# Patient Record
Sex: FEMALE | Race: WHITE | NOT HISPANIC OR LATINO | URBAN - METROPOLITAN AREA
[De-identification: names, ages, dates, MRNs, and addresses within clinical notes are randomized per-mention and may not be internally consistent; named-entity substitution may affect disease eponyms.]

---

## 2017-06-06 ENCOUNTER — APPOINTMENT (RX ONLY)
Dept: URBAN - METROPOLITAN AREA CLINIC 84 | Facility: CLINIC | Age: 67
Setting detail: DERMATOLOGY
End: 2017-06-06

## 2017-06-06 VITALS
WEIGHT: 130 LBS | HEIGHT: 66 IN | SYSTOLIC BLOOD PRESSURE: 177 MMHG | HEART RATE: 94 BPM | DIASTOLIC BLOOD PRESSURE: 107 MMHG

## 2017-06-06 DIAGNOSIS — L60.3 NAIL DYSTROPHY: ICD-10-CM

## 2017-06-06 PROCEDURE — ? BIOPSY BY PUNCH METHOD

## 2017-06-06 PROCEDURE — ? PRESCRIPTION

## 2017-06-06 RX ORDER — SULFAMETHOXAZOLE AND TRIMETHOPRIM 800; 160 MG/1; MG/1
TABLET ORAL
Qty: 14 | Refills: 0 | Status: ERX | COMMUNITY
Start: 2017-06-06

## 2017-06-06 RX ORDER — MUPIROCIN 20 MG/G
OINTMENT TOPICAL
Qty: 1 | Refills: 1 | Status: ERX | COMMUNITY
Start: 2017-06-06

## 2017-06-06 RX ADMIN — MUPIROCIN: 20 OINTMENT TOPICAL at 19:23

## 2017-06-06 RX ADMIN — SULFAMETHOXAZOLE AND TRIMETHOPRIM: 800; 160 TABLET ORAL at 19:23

## 2017-06-06 ASSESSMENT — LOCATION DETAILED DESCRIPTION DERM: LOCATION DETAILED: RIGHT 4TH TOENAIL

## 2017-06-06 ASSESSMENT — LOCATION ZONE DERM: LOCATION ZONE: TOENAIL

## 2017-06-06 ASSESSMENT — LOCATION SIMPLE DESCRIPTION DERM: LOCATION SIMPLE: RIGHT 4TH TOE

## 2017-06-06 NOTE — PROCEDURE: BIOPSY BY PUNCH METHOD
Dressing: bandage
Bill For Surgical Tray: no
Size Of Lesion In Cm (Optional): 0
Biopsy Type: H and E
Suture Removal: 14 days
Consent: Written consent was obtained and risks were reviewed including but not limited to scarring, infection, bleeding, scabbing, incomplete removal, nerve damage and allergy to anesthesia.
Anesthesia Volume In Cc (Will Not Render If 0): 0.5
Notification Instructions: Patient will be notified of biopsy results. However, patient instructed to call the office if not contacted within 2 weeks.
Epidermal Sutures: 4-0 Ethilon
Anesthesia Type: 1% lidocaine with epinephrine
Punch Size In Mm: 4
Billing Type: Third-Party Bill
Post-Care Instructions: I reviewed with the patient in detail post-care instructions. Patient is to keep the biopsy site dry overnight, and then apply bacitracin twice daily until healed. Patient may apply hydrogen peroxide soaks to remove any crusting.
Hemostasis: None
Detail Level: Detailed
Wound Care: Bacitracin
Home Suture Removal Text: Patient was provided a home suture removal kit and will remove their sutures at home.  If they have any questions or difficulties they will call the office.

## 2025-01-13 NOTE — HPI: NAIL DYSTROPHY
Guaifenesin    To help soothe your sore throat:  sip tea with honey (in those greater than or equal to 1 year old) or lemon; eat popsicles; gargle with warm salt water; take tylenol; use throat sprays     To help with nasal congestion/cough: stay hydrated; sip warm tea with a tablespoon of honey or chicken soup; use a topical saline spray; nasal suctioning/blow your nose frequently; use an air humidifier    To help with cough:  honey in those greater than or equal to 1 year old    Push fluids and get plenty of rest.  Follow-up with your primary care doctor for persistent symptoms.    
How Severe Is It?: moderate
Is This A New Presentation, Or A Follow-Up?: Nail Dystrophy